# Patient Record
Sex: MALE | Race: WHITE | URBAN - METROPOLITAN AREA
[De-identification: names, ages, dates, MRNs, and addresses within clinical notes are randomized per-mention and may not be internally consistent; named-entity substitution may affect disease eponyms.]

---

## 2017-02-04 ENCOUNTER — HOSPITAL ENCOUNTER (EMERGENCY)
Facility: CLINIC | Age: 22
Discharge: LEFT BEFORE DISCHARGE | End: 2017-02-04
Payer: COMMERCIAL

## 2017-02-04 VITALS
RESPIRATION RATE: 17 BRPM | OXYGEN SATURATION: 100 % | DIASTOLIC BLOOD PRESSURE: 69 MMHG | SYSTOLIC BLOOD PRESSURE: 134 MMHG | TEMPERATURE: 98.1 F | HEIGHT: 69 IN

## 2017-02-05 NOTE — ED NOTES
Fatigue, migraines, fogginess, and jaundice. Hx of concussion 3 years ago and has had various symptoms on and off.

## 2017-02-06 ENCOUNTER — CARE COORDINATION (OUTPATIENT)
Dept: INFECTIOUS DISEASES | Facility: CLINIC | Age: 22
End: 2017-02-06

## 2017-02-06 NOTE — PROGRESS NOTES
Request for sooner appointment. Mata went to [facility] ED over the weekend, left early due to long wait. Fatigue, jaundice, concerned about possible Lyme Infection. Scheduled to see Dr. Hyatt 2/8/17. Add on request sent to [facility].   Karlee Lowry LPN Coordinator

## 2017-02-08 ENCOUNTER — OFFICE VISIT (OUTPATIENT)
Dept: INFECTIOUS DISEASES | Facility: CLINIC | Age: 22
End: 2017-02-08
Attending: PEDIATRICS
Payer: COMMERCIAL

## 2017-02-08 VITALS
SYSTOLIC BLOOD PRESSURE: 122 MMHG | WEIGHT: 162.7 LBS | BODY MASS INDEX: 24.66 KG/M2 | DIASTOLIC BLOOD PRESSURE: 74 MMHG | HEART RATE: 48 BPM | HEIGHT: 68 IN

## 2017-02-08 DIAGNOSIS — R53.83 FATIGUE, UNSPECIFIED TYPE: Primary | ICD-10-CM

## 2017-02-08 LAB
ALBUMIN SERPL-MCNC: 4.6 G/DL (ref 3.4–5)
ALBUMIN UR-MCNC: NEGATIVE MG/DL
ALP SERPL-CCNC: 79 U/L (ref 40–150)
ALT SERPL W P-5'-P-CCNC: 51 U/L (ref 0–70)
ANION GAP SERPL CALCULATED.3IONS-SCNC: 6 MMOL/L (ref 3–14)
APPEARANCE UR: CLEAR
AST SERPL W P-5'-P-CCNC: 24 U/L (ref 0–45)
BASOPHILS # BLD AUTO: 0 10E9/L (ref 0–0.2)
BASOPHILS NFR BLD AUTO: 0.5 %
BILIRUB SERPL-MCNC: 0.5 MG/DL (ref 0.2–1.3)
BILIRUB UR QL STRIP: NEGATIVE
BUN SERPL-MCNC: 29 MG/DL (ref 7–30)
CALCIUM SERPL-MCNC: 9.3 MG/DL (ref 8.5–10.1)
CHLORIDE SERPL-SCNC: 104 MMOL/L (ref 94–109)
CO2 SERPL-SCNC: 27 MMOL/L (ref 20–32)
COLOR UR AUTO: YELLOW
CREAT SERPL-MCNC: 0.98 MG/DL (ref 0.66–1.25)
CRP SERPL-MCNC: <2.9 MG/L (ref 0–8)
DIFFERENTIAL METHOD BLD: NORMAL
EOSINOPHIL # BLD AUTO: 0.1 10E9/L (ref 0–0.7)
EOSINOPHIL NFR BLD AUTO: 1.2 %
ERYTHROCYTE [DISTWIDTH] IN BLOOD BY AUTOMATED COUNT: 12.6 % (ref 10–15)
ERYTHROCYTE [SEDIMENTATION RATE] IN BLOOD BY WESTERGREN METHOD: 4 MM/H (ref 0–15)
GFR SERPL CREATININE-BSD FRML MDRD: NORMAL ML/MIN/1.7M2
GLUCOSE SERPL-MCNC: 99 MG/DL (ref 70–99)
GLUCOSE UR STRIP-MCNC: NEGATIVE MG/DL
HCT VFR BLD AUTO: 50.1 % (ref 40–53)
HGB BLD-MCNC: 17.1 G/DL (ref 13.3–17.7)
HGB UR QL STRIP: NEGATIVE
IMM GRANULOCYTES # BLD: 0 10E9/L (ref 0–0.4)
IMM GRANULOCYTES NFR BLD: 0.2 %
KETONES UR STRIP-MCNC: NEGATIVE MG/DL
LEUKOCYTE ESTERASE UR QL STRIP: NEGATIVE
LYMPHOCYTES # BLD AUTO: 2.6 10E9/L (ref 0.8–5.3)
LYMPHOCYTES NFR BLD AUTO: 44.5 %
MCH RBC QN AUTO: 31.5 PG (ref 26.5–33)
MCHC RBC AUTO-ENTMCNC: 34.1 G/DL (ref 31.5–36.5)
MCV RBC AUTO: 92 FL (ref 78–100)
MONOCYTES # BLD AUTO: 0.6 10E9/L (ref 0–1.3)
MONOCYTES NFR BLD AUTO: 9.6 %
NEUTROPHILS # BLD AUTO: 2.6 10E9/L (ref 1.6–8.3)
NEUTROPHILS NFR BLD AUTO: 44 %
NITRATE UR QL: NEGATIVE
NRBC # BLD AUTO: 0 10*3/UL
NRBC BLD AUTO-RTO: 0 /100
PH UR STRIP: 5.5 PH (ref 5–7)
PLATELET # BLD AUTO: 226 10E9/L (ref 150–450)
POTASSIUM SERPL-SCNC: 4 MMOL/L (ref 3.4–5.3)
PROT SERPL-MCNC: 8.5 G/DL (ref 6.8–8.8)
RBC # BLD AUTO: 5.42 10E12/L (ref 4.4–5.9)
RBC #/AREA URNS AUTO: 1 /HPF (ref 0–2)
SODIUM SERPL-SCNC: 137 MMOL/L (ref 133–144)
SP GR UR STRIP: 1.02 (ref 1–1.03)
URN SPEC COLLECT METH UR: NORMAL
UROBILINOGEN UR STRIP-MCNC: NORMAL MG/DL (ref 0–2)
WBC # BLD AUTO: 5.8 10E9/L (ref 4–11)
WBC #/AREA URNS AUTO: <1 /HPF (ref 0–2)

## 2017-02-08 PROCEDURE — 99212 OFFICE O/P EST SF 10 MIN: CPT | Mod: ZF

## 2017-02-08 PROCEDURE — 85025 COMPLETE CBC W/AUTO DIFF WBC: CPT | Performed by: PEDIATRICS

## 2017-02-08 PROCEDURE — 36415 COLL VENOUS BLD VENIPUNCTURE: CPT | Performed by: PEDIATRICS

## 2017-02-08 PROCEDURE — 81001 URINALYSIS AUTO W/SCOPE: CPT | Performed by: PEDIATRICS

## 2017-02-08 PROCEDURE — 80053 COMPREHEN METABOLIC PANEL: CPT | Performed by: PEDIATRICS

## 2017-02-08 PROCEDURE — 85652 RBC SED RATE AUTOMATED: CPT | Performed by: PEDIATRICS

## 2017-02-08 PROCEDURE — 86140 C-REACTIVE PROTEIN: CPT | Performed by: PEDIATRICS

## 2017-02-08 ASSESSMENT — PAIN SCALES - GENERAL: PAINLEVEL: NO PAIN (0)

## 2017-02-08 NOTE — PROGRESS NOTES
"[facility]                   Date: 2017    To:Brenda Schofield NP  [facility] [address] [id number] [place] [address]    Pt: Mata Gonzalez  MR: [id number]  : 1995  SWATI: 2017    Dear Dr. Schofield    I had the pleasure of seeing Mata at the Pediatric Infectious Diseases Clinic at the [facility]. Mata is a 21 year old male who was referred to my clinic for an out-patient consultation for fatigue, headaches, and rash with specific concern for Lyme disease. Mata report lat least a year long history of recurrent and debilitating headaches and pain around his eyes (R>L) with occasional \"bugging\" around the eyes. 3 years ago he had a concussion and was out of school for 4 months. He has been doing better since, and the current issues with headaches seem to worsen and he does not think it is necessarily related to the TBI. He was seen at [facility] neurology clinic, but no specific diagnosis was made. He has also been followed for several months by a chiropractor. Several days ago he noticed his skin to develop yellow discoloration, but that has since resolved and currently his skin color seems to him normal. He has occasional abdominal pain and he report the pain usually start in the lower back, and makes it way to the lower abdomen. Mata is a student at the  [employer] , but due to the increase fatigue he has missed a lot of school the past semester and now is trying to catch-up. He goes to the Gym ~5 times a week and spend 1-1.5 hour, but he reports that this has been harder on him lately. He also bikes a lot. He has history of one episode of syncope several years ago, with no sequale. Mata and his Mom are concerned that his constellation of symptoms may be due to Lyme disease, or other occult infection.        Review of Systems: Positive for fatigue, headaches, skin discoloration, joint pain " (no swelling) otherwise negative.   Past Medical History: History reviewed. No pertinent past medical history.  Social History: [place] in an apartment around campus.   Immunization:   There is no immunization history on file for this patient. Up to date per report.   Allergies: No Known Allergies      medications:   Current Outpatient Prescriptions   Medication Sig     NAPROXEN PO Take 600 mg by mouth 2 times daily as needed for moderate pain     No current facility-administered medications for this visit.        Physical Exam Vitals were reviewed      BP: 122/74 mmHg Pulse: (!) 48            Appearance: Alert and appropriate, well developed, nontoxic, with moist mucous membranes.  HEENT: Head: Normocephalic and atraumatic. Eyes: PERRL, EOM grossly intact, conjunctivae and sclerae clear. Ears: Tympanic membranes clear bilaterally, without inflammation or effusion. Nose: Nares clear with no active discharge.  Mouth/Throat: No oral lesions, pharynx clear with no erythema or exudate.  Neck: Supple, no masses, no meningismus. No significant cervical lymphadenopathy.  Pulmonary: No grunting, flaring, retractions or stridor. Good air entry, clear to auscultation bilaterally, with no rales, rhonchi, or wheezing.  Cardiovascular: Regular rate and rhythm, normal S1 and S2, with no murmurs.  Normal symmetric peripheral pulses and brisk cap refill.  Abdominal: Normal bowel sounds, soft, nontender, nondistended, with no masses and no hepatosplenomegaly.  Neurologic: Alert and oriented, cranial nerves II-XII grossly intact, moving all extremities equally with grossly normal coordination and normal gait.  Extremities/Back: No deformity, no CVA tenderness.  Skin: No significant rashes, ecchymoses, or lacerations.  Genitourinary: Deferred  Rectal:  Deferred  :    Lab:   Results for orders placed or performed in visit on 02/08/17 (from the past 48 hour(s))   Routine UA with microscopic - No culture   Result Value Ref Range    Color  Urine Yellow     Appearance Urine Clear     Glucose Urine Negative NEG mg/dL    Bilirubin Urine Negative NEG    Ketones Urine Negative NEG mg/dL    Specific Gravity Urine 1.020 1.003 - 1.035    Blood Urine Negative NEG    pH Urine 5.5 5.0 - 7.0 pH    Protein Albumin Urine Negative NEG mg/dL    Urobilinogen mg/dL Normal 0.0 - 2.0 mg/dL    Nitrite Urine Negative NEG    Leukocyte Esterase Urine Negative NEG    Source Unspecified Urine     WBC Urine <1 0 - 2 /HPF    RBC Urine 1 0 - 2 /HPF   CBC with platelets differential   Result Value Ref Range    WBC 5.8 4.0 - 11.0 10e9/L    RBC Count 5.42 4.4 - 5.9 10e12/L    Hemoglobin 17.1 13.3 - 17.7 g/dL    Hematocrit 50.1 40.0 - 53.0 %    MCV 92 78 - 100 fl    MCH 31.5 26.5 - 33.0 pg    MCHC 34.1 31.5 - 36.5 g/dL    RDW 12.6 10.0 - 15.0 %    Platelet Count 226 150 - 450 10e9/L    Diff Method Automated Method     % Neutrophils 44.0 %    % Lymphocytes 44.5 %    % Monocytes 9.6 %    % Eosinophils 1.2 %    % Basophils 0.5 %    % Immature Granulocytes 0.2 %    Nucleated RBCs 0 0 /100    Absolute Neutrophil 2.6 1.6 - 8.3 10e9/L    Absolute Lymphocytes 2.6 0.8 - 5.3 10e9/L    Absolute Monocytes 0.6 0.0 - 1.3 10e9/L    Absolute Eosinophils 0.1 0.0 - 0.7 10e9/L    Absolute Basophils 0.0 0.0 - 0.2 10e9/L    Abs Immature Granulocytes 0.0 0 - 0.4 10e9/L    Absolute Nucleated RBC 0.0    Comprehensive metabolic panel   Result Value Ref Range    Sodium 137 133 - 144 mmol/L    Potassium 4.0 3.4 - 5.3 mmol/L    Chloride 104 94 - 109 mmol/L    Carbon Dioxide 27 20 - 32 mmol/L    Anion Gap 6 3 - 14 mmol/L    Glucose 99 70 - 99 mg/dL    Urea Nitrogen 29 7 - 30 mg/dL    Creatinine 0.98 0.66 - 1.25 mg/dL    GFR Estimate >90  Non  GFR Calc   >60 mL/min/1.7m2    GFR Estimate If Black >90   GFR Calc   >60 mL/min/1.7m2    Calcium 9.3 8.5 - 10.1 mg/dL    Bilirubin Total 0.5 0.2 - 1.3 mg/dL    Albumin 4.6 3.4 - 5.0 g/dL    Protein Total 8.5 6.8 - 8.8 g/dL    Alkaline  Phosphatase 79 40 - 150 U/L    ALT 51 0 - 70 U/L    AST 24 0 - 45 U/L   Erythrocyte sedimentation rate auto   Result Value Ref Range    Sed Rate 4 0 - 15 mm/h   CRP inflammation   Result Value Ref Range    CRP Inflammation <2.9 0.0 - 8.0 mg/L        Assessment and plan: Mata history of long standing headaches and fatigue is not suggestive of Lyme disease. The constellation of symptoms you describe do not suggest any other infectious disease or an immune deficiency. Blood testing today is reassuring without evidence of systemic inflammation, liver abnormality, or other abnormality. I suggested to Mata and his Mom a referral to Integrative Medicine clinic.     Follow-up appointment was not made.    Of course, if symptoms reoccur or any new issue arise I would be happy to see Mata again at clinic sooner.    Please contact me directly with any questions.    Thank you for allowing me to assist in Mata's care.     I spent a total of 60 minutes face-to-face with Maat and his mother during today s office visit. Over 50% of this encounter time was spent counseling the patient and/or coordinating care.      Sincerely,    Neville Hyatt MD    Pediatric Infectious Diseases  [facility]  [facility]  Clinic Coordinator (Karlee Dubonto): [phone]  Clinic Fax: [phone]  Clinic [phone]  Dr Hyatt's email: [email or web address]    FÉLIX SAENZ    Copy to patient  LAZ DIMAS CHRIS  [address]   [facility] [address]

## 2017-02-08 NOTE — NURSING NOTE
"Chief Complaint   Patient presents with     Consult     \"yellowing of skin in the face\", some neck pains, side pains, headaches, nausea, foggyness, mental confusions, unable to sleep, eye pain, heart racing, joint pain, over stimulation anxiety.       Initial /74 mmHg  Pulse 48  Ht 5' 8.47\" (173.9 cm)  Wt 162 lb 11.2 oz (73.8 kg)  BMI 24.40 kg/m2 Estimated body mass index is 24.4 kg/(m^2) as calculated from the following:    Height as of this encounter: 5' 8.47\" (173.9 cm).    Weight as of this encounter: 162 lb 11.2 oz (73.8 kg).      "

## 2017-02-08 NOTE — Clinical Note
"  2017      RE: Mata Gonzalez  [address]   [facility] [address]       [facility]                   Date: 2017    To:Brenda Schofield NP  [place] [facility]   [address] [place] [address]    Pt: Mata Gonzalez  MR: [id number]  : 1995  SWATI: 2017    Dear Dr. Schofield    I had the pleasure of seeing Mata at the Pediatric Infectious Diseases Clinic at the [facility]. Mata is a 21 year old male who was referred to my clinic for an out-patient consultation for fatigue, headaches, and rash with specific concern for Lyme disease. Mata report lat least a year long history of recurrent and debilitating headaches and pain around his eyes (R>L) with occasional \"bugging\" around the eyes. 3 years ago he had a concussion and was out of school for 4 months. He has been doing better since, and the current issues with headaches seem to worsen and he does not think it is necessarily related to the TBI. He was seen at [facility] neurology clinic, but no specific diagnosis was made. He has also been followed for several months by a chiropractor. Several days ago he noticed his skin to develop yellow discoloration, but that has since resolved and currently his skin color seems to him normal. He has occasional abdominal pain and he report the pain usually start in the lower back, and makes it way to the lower abdomen. Mata is a student at the [employer], but due to the increase fatigue he has missed a lot of school the past semester and now is trying to catch-up. He goes to the Gym ~5 times a week and spend 1-1.5 hour, but he reports that this has been harder on him lately. He also bikes a lot. He has history of one episode of syncope several years ago, with no sequale. Mata and his Mom are concerned that his constellation of symptoms may be due to Lyme disease, or other occult infection.  "       Review of Systems: Positive for fatigue, headaches, skin discoloration, joint pain (no swelling) otherwise negative.   Past Medical History: History reviewed. No pertinent past medical history.  Social History: [place] an apartment around campus.   Immunization:   There is no immunization history on file for this patient. Up to date per report.   Allergies: No Known Allergies      medications:   Current Outpatient Prescriptions   Medication Sig     NAPROXEN PO Take 600 mg by mouth 2 times daily as needed for moderate pain     No current facility-administered medications for this visit.        Physical Exam Vitals were reviewed      BP: 122/74 mmHg Pulse: (!) 48            Appearance: Alert and appropriate, well developed, nontoxic, with moist mucous membranes.  HEENT: Head: Normocephalic and atraumatic. Eyes: PERRL, EOM grossly intact, conjunctivae and sclerae clear. Ears: Tympanic membranes clear bilaterally, without inflammation or effusion. Nose: Nares clear with no active discharge.  Mouth/Throat: No oral lesions, pharynx clear with no erythema or exudate.  Neck: Supple, no masses, no meningismus. No significant cervical lymphadenopathy.  Pulmonary: No grunting, flaring, retractions or stridor. Good air entry, clear to auscultation bilaterally, with no rales, rhonchi, or wheezing.  Cardiovascular: Regular rate and rhythm, normal S1 and S2, with no murmurs.  Normal symmetric peripheral pulses and brisk cap refill.  Abdominal: Normal bowel sounds, soft, nontender, nondistended, with no masses and no hepatosplenomegaly.  Neurologic: Alert and oriented, cranial nerves II-XII grossly intact, moving all extremities equally with grossly normal coordination and normal gait.  Extremities/Back: No deformity, no CVA tenderness.  Skin: No significant rashes, ecchymoses, or lacerations.  Genitourinary: Deferred  Rectal:  Deferred  :    Lab:   Results for orders placed or performed in visit on 02/08/17 (from the past  48 hour(s))   Routine UA with microscopic - No culture   Result Value Ref Range    Color Urine Yellow     Appearance Urine Clear     Glucose Urine Negative NEG mg/dL    Bilirubin Urine Negative NEG    Ketones Urine Negative NEG mg/dL    Specific Gravity Urine 1.020 1.003 - 1.035    Blood Urine Negative NEG    pH Urine 5.5 5.0 - 7.0 pH    Protein Albumin Urine Negative NEG mg/dL    Urobilinogen mg/dL Normal 0.0 - 2.0 mg/dL    Nitrite Urine Negative NEG    Leukocyte Esterase Urine Negative NEG    Source Unspecified Urine     WBC Urine <1 0 - 2 /HPF    RBC Urine 1 0 - 2 /HPF   CBC with platelets differential   Result Value Ref Range    WBC 5.8 4.0 - 11.0 10e9/L    RBC Count 5.42 4.4 - 5.9 10e12/L    Hemoglobin 17.1 13.3 - 17.7 g/dL    Hematocrit 50.1 40.0 - 53.0 %    MCV 92 78 - 100 fl    MCH 31.5 26.5 - 33.0 pg    MCHC 34.1 31.5 - 36.5 g/dL    RDW 12.6 10.0 - 15.0 %    Platelet Count 226 150 - 450 10e9/L    Diff Method Automated Method     % Neutrophils 44.0 %    % Lymphocytes 44.5 %    % Monocytes 9.6 %    % Eosinophils 1.2 %    % Basophils 0.5 %    % Immature Granulocytes 0.2 %    Nucleated RBCs 0 0 /100    Absolute Neutrophil 2.6 1.6 - 8.3 10e9/L    Absolute Lymphocytes 2.6 0.8 - 5.3 10e9/L    Absolute Monocytes 0.6 0.0 - 1.3 10e9/L    Absolute Eosinophils 0.1 0.0 - 0.7 10e9/L    Absolute Basophils 0.0 0.0 - 0.2 10e9/L    Abs Immature Granulocytes 0.0 0 - 0.4 10e9/L    Absolute Nucleated RBC 0.0    Comprehensive metabolic panel   Result Value Ref Range    Sodium 137 133 - 144 mmol/L    Potassium 4.0 3.4 - 5.3 mmol/L    Chloride 104 94 - 109 mmol/L    Carbon Dioxide 27 20 - 32 mmol/L    Anion Gap 6 3 - 14 mmol/L    Glucose 99 70 - 99 mg/dL    Urea Nitrogen 29 7 - 30 mg/dL    Creatinine 0.98 0.66 - 1.25 mg/dL    GFR Estimate >90  Non  GFR Calc   >60 mL/min/1.7m2    GFR Estimate If Black >90   GFR Calc   >60 mL/min/1.7m2    Calcium 9.3 8.5 - 10.1 mg/dL    Bilirubin Total 0.5 0.2 - 1.3  mg/dL    Albumin 4.6 3.4 - 5.0 g/dL    Protein Total 8.5 6.8 - 8.8 g/dL    Alkaline Phosphatase 79 40 - 150 U/L    ALT 51 0 - 70 U/L    AST 24 0 - 45 U/L   Erythrocyte sedimentation rate auto   Result Value Ref Range    Sed Rate 4 0 - 15 mm/h   CRP inflammation   Result Value Ref Range    CRP Inflammation <2.9 0.0 - 8.0 mg/L        Assessment and plan: Mata history of long standing headaches and fatigue is not suggestive of Lyme disease. The constellation of symptoms you describe do not suggest any other infectious disease or an immune deficiency. Blood testing today is reassuring without evidence of systemic inflammation, liver abnormality, or other abnormality. I suggested to Mata and his Mom a referral to Integrative Medicine clinic.     Follow-up appointment was not made.    Of course, if symptoms reoccur or any new issue arise I would be happy to see Mata again at clinic sooner.    Please contact me directly with any questions.    Thank you for allowing me to assist in Mata's care.     I spent a total of 60 minutes face-to-face with Mata and his mother during today s office visit. Over 50% of this encounter time was spent counseling the patient and/or coordinating care.      Sincerely,    Neville Hyatt MD    Pediatric Infectious Diseases  [facility]  [facility]  Clinic Coordinator (Karlee Lowry): [phone]  Clinic Fax: [phone]  Clinic [phone]  Dr Hyatt's email: [email or web address]    FÉLIX SAENZ    Copy to patient  [address]   [facility] [address]

## 2017-02-08 NOTE — NURSING NOTE
Patient weight: 73.8 kg (actual weight)  Weight-based dose: Patient weight > 10 k.5 grams (1/2 of 5 gram tube)  Site: left antecubital and right antecubital  Previous allergies: No    Georgie Christiansen

## 2017-02-08 NOTE — PATIENT INSTRUCTIONS
Mata was seen today (2017) at the Pediatric Infectious Diseases clinic ([facility]) for evaluation of fatigue, frequent headaches, and recent yellow skin discoloration.    The following is a brief outline of the plan as we discussed during the  visit: Mata history of long standing headaches and fatigue is not suggestive of Lyme disease. Please see below for a discussion regarding lyme disease. The constellation of symptoms you describe do not suggest any other infectious disease or an immune deficiency. It is important to check liver enzymes as yellow skin discoloration may represent liver problems. We will also draw blood for complete blood count, chemisty panel, and inflammatory markers, as well as a urine test. Further management will probably include referral to a specialist, and will depend on results. We can discuss that over the phone or by email.     We ordered the following laboratory tests: Complete blood count, comprehensive metabolic panel, ESR, CRP, UA    We will contact you with any pertinent results as we get them. Meanwhile  feel free to contact our clinic at any time with questions and  clarifications.    A follow up appointment was not scheduled.    Thank you,    Neville Hyatt MD    Pediatric Infectious Diseases clinic  [facility].    Contact info:  Clinic Coordinator (Karlee Lowry): [phone]  Clinic Fax: [phone]  Dr Hyatt email: [phone]    -----------------------------------------------------------------------------------------------------------------------------------------------------------------------------    Lyme disease is the name of an infection by the Borrelia Burgdoferi, a spirochete bacterium. The infection is transmitted by the deer tick (ixodes scapularis) during its attachment to a human  "host for feeding. The bacteria can only be transmitted to human after the tick was attached for at least 24 hours. Ticks that are just  walking  on a person s skin and not yet attached cannot transmit the infection. People at all ages can get this infection, usually during the spring to fall months, while the ticks are active. When bacteria are entering the human body via the tick bite, Lyme disease may develop.   Clinical: Commonly this infection has three stages.   The primary phase, also called  Early Localized Lyme Disease  is characterized by an expanding skin rash called \"erythema migrans.\" It begins within 3-30 days of the tick bite as a reddened area near the tick bite. As the rash increases in size, it often clears in the middle and develops a red ring around the outside, so that it has a  bull's eye  appearance.  It does not always look like a bull's eye, but it does expand in size. This rash is usually isolated without any accompanying signs or symptoms but fever, general fatigue, muscle pain, and headaches may develop.   The second phase is also called  Early Disseminated Lyme Disease . It may develop within days to weeks after the initial tick bite and is characterized with  flu-like  illness including fever, chills, headaches, and fatigue. This stage may also present with variety of other symptoms such as multiple skin rashes (disseminated erythema migrans), [place] Palsy (temporary drop of one eye-lid and asymmetric smile), heart arrhythmias, and swollen joints.   The third phase, also called  Late Disseminated Lyme Disease  may develop weeks to years after the initial bite. The most common symptom at this stage is by far swollen joints (arthritis). Arthritis commonly affects one of the large joints (particularly knee). This may occur just once or reoccur in either different joints or the same joint. Rarely, the arthritis become chronic and requires specific care by a rheumatologist and/or an orthopedic " surgeon.   Approximately 10-20% of patients with Lyme disease have symptoms that last months to years after treatment with antibiotics. These symptoms can include muscle and joint pains, cognitive defects, sleep disturbance, or fatigue. The cause of these symptoms is not known, but there is no evidence that these symptoms are due to ongoing infection with B. burgdorferi. This condition is referred to as Post-treatment Lyme disease syndrome (PTLDS). There is some evidence that PTLDS is caused by an autoimmune response, in which a person's immune system continues to respond, doing damage to the body s tissues, even after the infection has been cleared. Studies have shown that continuing antibiotic therapy is not helpful and can be harmful for persons with PTLDS.  Diagnosis:   Lyme disease is diagnosed based on characteristic signs and symptoms in a patient with exposure history to deer ticks. The diagnosis can be confirmed with laboratory tests. Laboratory testing for Lyme disease include a two tier approach which include an initial screen and a confirmatory testing. A negative initial screen is very reliable in ruling-out this infection. On the other hand, a positive screen does not mean one has Lyme disease. The confirmatory testing, which is commonly done only after the screen is positive has better prediction for disease when it is positive. Laboratory testing is not recommended for patients who do not have the typical signs or symptoms of Lyme disease. Just as it is important to correctly diagnose Lyme disease when a patient has it, it is important to avoid misdiagnosis which in turn lead to unnecessary treatment or missing a correct diagnosis of another illness.   Treatment:   Treatment of Lyme disease depends on the stage and extent of the disease and the age of the patient. Early local infection can be easily treated with 14-21 days course of doxycycline for anyone 8 year and older. Children younger than 8  years of age should take amoxicillin. Same approach is usually given for non-complicated early disseminated infection or late arthritis. If the patient has neurological involvement or persistent symptoms intravenous antibiotic (ceftriaxone) is the treatment of choice.   Prognosis:   The over-all prognosis of Lyme disease is excellent. The vast majority of patient with this infection will recover fully, without any complication within few weeks. Recovery rarely takes longer in cases of PTLDS and may take up to months or years.  Prevention:   Reducing exposure to ticks is the best defense against Lyme disease and other tickborne infections. There are several steps you and your family can take to prevent and control Lyme disease: --     Avoid Direct Contact with Ticks.   o Avoid wooded and bushy areas with high grass and leaf litter.   o Walk in the center of trails.  o Wear boots, pants, and long sleeve shirts.    Repel Ticks with DEET or Permethrin.  o DEET products are safe to use on children 2 months old and above using 10%-30%.   o Use products that contain permethrin on clothing.   o Treat clothing and gear, such as boots, pants, socks and tents.   o It remains protective through several washings. Pre-treated clothing is available and remains protective for up to 70 washings.   o Other repellents registered by the Environmental Protection Agency (EPA) may be found at http://cfpub.epa.gov/oppref/insect/.    Find and Remove Ticks from Your Body  o Bathe or shower as soon as possible after coming indoors (preferably within two hours) to wash off and more easily find ticks that are crawling on you.   o Conduct a full-body tick check using a hand-held or full-length mirror to view all parts of your body upon return from tick-infested areas. Parents should check their children for ticks under the arms, in and around the ears, inside the belly button, behind the knees, between the legs, around the waist, and especially  in their hair.   o Examine gear and pets. Ticks can ride into the home on clothing and pets, then attach to a person later, so carefully examine pets, coats, and day packs.   o Tumble clothes in a dryer on high heat for an hour to kill remaining ticks.  More information can be found on the internet at:  http://www.cdc.gov/lyme/; http://www.health.Count includes the [facility].mn./divs/idepc/diseases/lyme/index.html; http://www.idsociety.org/Templates/threeColumn.aspx?nparix=34626541742,   When searching the internet for Lyme disease, please be advised to use only reliable sources such as the CDC, MD, [facility], and such.    Neville Hyatt MD  Pediatric Infectious Diseases  [facility]

## 2017-02-08 NOTE — MR AVS SNAPSHOT
After Visit Summary   2017    Mata Gonzalez    MRN: [id number]           Patient Information     Date Of Birth          1995        Visit Information        Provider Department      2017 9:30 AM Neville Hyatt MD Peds Infectious Disease        Today's Diagnoses     Fatigue, unspecified type    -  1       Care Instructions    Mata was seen today (2017) at the Pediatric Infectious Diseases clinic ([facility]) for evaluation of fatigue, frequent headaches, and recent yellow skin discoloration.    The following is a brief outline of the plan as we discussed during the  visit: Mata history of long standing headaches and fatigue is not suggestive of Lyme disease. Please see below for a discussion regarding lyme disease. The constellation of symptoms you describe do not suggest any other infectious disease or an immune deficiency. It is important to check liver enzymes as yellow skin discoloration may represent liver problems. We will also draw blood for complete blood count, chemisty panel, and inflammatory markers, as well as a urine test. Further management will probably include referral to a specialist, and will depend on results. We can discuss that over the phone or by email.     We ordered the following laboratory tests: Complete blood count, comprehensive metabolic panel, ESR, CRP, UA    We will contact you with any pertinent results as we get them. Meanwhile  feel free to contact our clinic at any time with questions and  clarifications.    A follow up appointment was not scheduled.    Thank you,    Neville Hyatt MD    Pediatric Infectious Diseases clinic  [facility].    Contact info:  Clinic Coordinator (Karlee Lowry): [phone]  Clinic Fax: [phone]  Dr Hyatt email: [email or web address]  [phone]    -----------------------------------------------------------------------------------------------------------------------------------------------------------------------------    Lyme disease is the name of an infection by the Borrelia Burgdoferi, a spirochete bacterium. The infection is transmitted by the deer tick (ixodes scapularis) during its attachment to a human host for feeding. The bacteria can only be transmitted to human after the tick was attached for at least 24 hours. Ticks that are just  walking  on a person s skin and not yet attached cannot transmit the infection. People at all ages can get this infection, usually during the spring to fall months, while the ticks are active. When bacteria are entering the human body via the tick bite, Lyme disease may develop.   Clinical: Commonly this infection has three stages.   The primary phase, also called  Early Localized Lyme Disease  is characterized by an expanding skin rash called \"erythema migrans.\" It begins within 3-30 days of the tick bite as a reddened area near the tick bite. As the rash increases in size, it often clears in the middle and develops a red ring around the outside, so that it has a  bull's eye  appearance.  It does not always look like a bull's eye, but it does expand in size. This rash is usually isolated without any accompanying signs or symptoms but fever, general fatigue, muscle pain, and headaches may develop.   The second phase is also called  Early Disseminated Lyme Disease . It may develop within days to weeks after the initial tick bite and is characterized with  flu-like  illness including fever, chills, headaches, and fatigue. This stage may also present with variety of other symptoms such as multiple skin rashes (disseminated erythema migrans), [place] Palsy (temporary drop of one eye-lid and asymmetric smile), heart arrhythmias, and swollen joints.   The third phase, also called  Late Disseminated Lyme Disease  may " develop weeks to years after the initial bite. The most common symptom at this stage is by far swollen joints (arthritis). Arthritis commonly affects one of the large joints (particularly knee). This may occur just once or reoccur in either different joints or the same joint. Rarely, the arthritis become chronic and requires specific care by a rheumatologist and/or an orthopedic surgeon.   Approximately 10-20% of patients with Lyme disease have symptoms that last months to years after treatment with antibiotics. These symptoms can include muscle and joint pains, cognitive defects, sleep disturbance, or fatigue. The cause of these symptoms is not known, but there is no evidence that these symptoms are due to ongoing infection with B. burgdorferi. This condition is referred to as Post-treatment Lyme disease syndrome (PTLDS). There is some evidence that PTLDS is caused by an autoimmune response, in which a person's immune system continues to respond, doing damage to the body s tissues, even after the infection has been cleared. Studies have shown that continuing antibiotic therapy is not helpful and can be harmful for persons with PTLDS.  Diagnosis:   Lyme disease is diagnosed based on characteristic signs and symptoms in a patient with exposure history to deer ticks. The diagnosis can be confirmed with laboratory tests. Laboratory testing for Lyme disease include a two tier approach which include an initial screen and a confirmatory testing. A negative initial screen is very reliable in ruling-out this infection. On the other hand, a positive screen does not mean one has Lyme disease. The confirmatory testing, which is commonly done only after the screen is positive has better prediction for disease when it is positive. Laboratory testing is not recommended for patients who do not have the typical signs or symptoms of Lyme disease. Just as it is important to correctly diagnose Lyme disease when a patient has it, it is  important to avoid misdiagnosis which in turn lead to unnecessary treatment or missing a correct diagnosis of another illness.   Treatment:   Treatment of Lyme disease depends on the stage and extent of the disease and the age of the patient. Early local infection can be easily treated with 14-21 days course of doxycycline for anyone 8 year and older. Children younger than 8 years of age should take amoxicillin. Same approach is usually given for non-complicated early disseminated infection or late arthritis. If the patient has neurological involvement or persistent symptoms intravenous antibiotic (ceftriaxone) is the treatment of choice.   Prognosis:   The over-all prognosis of Lyme disease is excellent. The vast majority of patient with this infection will recover fully, without any complication within few weeks. Recovery rarely takes longer in cases of PTLDS and may take up to months or years.  Prevention:   Reducing exposure to ticks is the best defense against Lyme disease and other tickborne infections. There are several steps you and your family can take to prevent and control Lyme disease: --     Avoid Direct Contact with Ticks.   o Avoid wooded and bushy areas with high grass and leaf litter.   o Walk in the center of trails.  o Wear boots, pants, and long sleeve shirts.    Repel Ticks with DEET or Permethrin.  o DEET products are safe to use on children 2 months old and above using 10%-30%.   o Use products that contain permethrin on clothing.   o Treat clothing and gear, such as boots, pants, socks and tents.   o It remains protective through several washings. Pre-treated clothing is available and remains protective for up to 70 washings.   o Other repellents registered by the Environmental Protection Agency (EPA) may be found at http://cfpub.epa.gov/oppref/insect/.    Find and Remove Ticks from Your Body  o Bathe or shower as soon as possible after coming indoors (preferably within two hours) to wash off  and more easily find ticks that are crawling on you.   o Conduct a full-body tick check using a hand-held or full-length mirror to view all parts of your body upon return from tick-infested areas. Parents should check their children for ticks under the arms, in and around the ears, inside the belly button, behind the knees, between the legs, around the waist, and especially in their hair.   o Examine gear and pets. Ticks can ride into the home on clothing and pets, then attach to a person later, so carefully examine pets, coats, and day packs.   o Tumble clothes in a dryer on high heat for an hour to kill remaining ticks.  More information can be found on the internet at:  http://www.cdc.gov/lyme/; http://www.health.[facility].mn.us/divs/idepc/diseases/lyme/index.html; http://www.idsociety.org/Templates/threeColumn.aspx?idtcld=34415455585,   When searching the internet for Lyme disease, please be advised to use only reliable sources such as the [facility], [facility], [facility], and such.    Neville Hyatt MD  Pediatric Infectious Diseases  [facility]          Follow-ups after your visit        Who to contact     Please call your clinic at [phone] to:    Ask questions about your health    Make or cancel appointments    Discuss your medicines    Learn about your test results    Speak to your doctor   If you have compliments or concerns about an experience at your clinic, or if you wish to file a complaint, please contact [facility] Patient Relations at [phone] or email us at [email or web address]         Additional Information About Your Visit        [employer] Information     [employer] is an electronic gateway that provides easy, online access to your medical records. With [employer], you can request a clinic appointment, read your test results, renew a prescription or communicate with your care team.     To sign up for [employer] visit the website at  "[email or web address]   You will be asked to enter the access code listed below, as well as some personal information. Please follow the directions to create your username and password.     Your access code is: [email or web address]  Expires: 2017 10:29 AM     Your access code will  in 90 days. If you need help or a new code, please contact your [facility] or call [phone] for assistance.        Care EveryWhere ID     This is your Care EveryWhere ID. This could be used by other organizations to access your [place] medical records  [id number]        Your Vitals Were     Pulse Height BMI (Body Mass Index)             48 5' 8.47\" (173.9 cm) 24.40 kg/m2          Blood Pressure from Last 3 Encounters:   17 122/74   17 134/69    Weight from Last 3 Encounters:   17 162 lb 11.2 oz (73.8 kg)              We Performed the Following     CBC with platelets differential     Comprehensive metabolic panel     CRP inflammation     Erythrocyte sedimentation rate auto     Routine UA with microscopic - No culture        Primary Care Provider Office Phone # Fax #    [facility] [phone]       [facility] [address]        Thank you!     Thank you for choosing PEDS INFECTIOUS DISEASE  for your care. Our goal is always to provide you with excellent care. Hearing back from our patients is one way we can continue to improve our services. Please take a few minutes to complete the written survey that you may receive in the mail after your visit with us. Thank you!             Your Updated Medication List - Protect others around you: Learn how to safely use, store and throw away your medicines at www.disposemymeds.org.          This list is accurate as of: 17 10:31 AM.  Always use your most recent med list.                   Brand Name Dispense Instructions for use    NAPROXEN PO      Take 600 mg by mouth 2 times daily as needed for moderate " pain

## 2019-03-07 ENCOUNTER — ANCILLARY PROCEDURE (OUTPATIENT)
Dept: ULTRASOUND IMAGING | Facility: CLINIC | Age: 24
End: 2019-03-07
Attending: PEDIATRICS
Payer: COMMERCIAL

## 2019-03-07 DIAGNOSIS — R52 PAIN: ICD-10-CM
